# Patient Record
(demographics unavailable — no encounter records)

---

## 2025-02-01 NOTE — HISTORY OF PRESENT ILLNESS
[de-identified] : This is a 5 year old patient here for further evaluation of  abd pain. She often complains of stomach issues. Mom has coliits and IBS. She is gluten-free and off dairy, changed probiotic.  Issues began when she was 2, abdominal pain was intermittent, could go a long time without pain but would happen again. Happened 2 more times and saw GI. Mom tried to add more prune juice but she did not like it.   Stools were every day, now she is stooling most days but not enough.  Stool volume small. Solid. [place] 1-3. Stool is hard to pass and takes a long time. Has rectal pain with stooling. There is no blood or mucous in the stool. With the ex-lax there was some mucous and was on the ex-lax for 2-3 wks, 2 sq daily, when she first took it, it was like diarrhea and then back to solid. Has tried to increased fiber and oatmeal in the diet, tried to increase veg.  Very active.    Rare emesis, last episode of emesis was over a year ago. No nausea. Stomach aches are often, almost daily.

## 2025-02-01 NOTE — ASSESSMENT
[FreeTextEntry1] : 5-year-old female with history of intermittent abdominal discomfort accompanied by hard infrequent stools.  There is a remote history of vomiting which has since resolved.  Most likely has constipation I will treat her for constipation and reassess.  Recommend: -Start magnesium chews start with 1 to 2/day and increase to yield soft daily stools -Increase fiber and fluids in the diet -Labs - Family instructed to call for lab results or if any questions or concerns. Family was asked to make a follow-up visit to be seen in 2 to 3 months

## 2025-02-01 NOTE — HISTORY OF PRESENT ILLNESS
[de-identified] : This is a 5 year old patient here for further evaluation of  abd pain. She often complains of stomach issues. Mom has coliits and IBS. She is gluten-free and off dairy, changed probiotic.  Issues began when she was 2, abdominal pain was intermittent, could go a long time without pain but would happen again. Happened 2 more times and saw GI. Mom tried to add more prune juice but she did not like it.   Stools were every day, now she is stooling most days but not enough.  Stool volume small. Solid. [place] 1-3. Stool is hard to pass and takes a long time. Has rectal pain with stooling. There is no blood or mucous in the stool. With the ex-lax there was some mucous and was on the ex-lax for 2-3 wks, 2 sq daily, when she first took it, it was like diarrhea and then back to solid. Has tried to increased fiber and oatmeal in the diet, tried to increase veg.  Very active.    Rare emesis, last episode of emesis was over a year ago. No nausea. Stomach aches are often, almost daily.

## 2025-02-01 NOTE — CONSULT LETTER
[Dear  ___] : Dear  [unfilled], [Consult Letter:] : I had the pleasure of evaluating your patient, [unfilled]. [Please see my note below.] : Please see my note below. [Consult Closing:] : Thank you very much for allowing me to participate in the care of this patient.  If you have any questions, please do not hesitate to contact me. [Sincerely,] : Sincerely, [FreeTextEntry3] : Michelle Lewis MD Attending Physician [facility]

## 2025-02-01 NOTE — PHYSICAL EXAM
[Well Developed] : well developed [Well Nourished] : well nourished [NAD] : in no acute distress [PERRL] : pupils were equal, round, reactive to light  [icteric] : anicteric [Moist & Pink Mucous Membranes] : moist and pink mucous membranes [Oral Ulcers] : no oral ulcers [CTAB] : lungs clear to auscultation bilaterally [Respiratory Distress] : no respiratory distress  [Wheeze] : no wheezing  [Regular Rate and Rhythm] : regular rate and rhythm [Normal S1, S2] : normal S1 and S2 [Murmur] : no murmur [Soft] : soft  [Distended] : non distended [Tender] : non tender [Normal Bowel Sounds] : normal bowel sounds [Stool Palpable] : no stool palpable [Mass ___ cm] : no masses were palpated [No HSM] : no hepatosplenomegaly appreciated [Lymphadenopathy] : no lymphadenopathy  [Normal Tone] : normal tone [Well-Perfused] : well-perfused [Edema] : no edema [Cyanosis] : no cyanosis [Rash] : no rash [Jaundice] : no jaundice [Interactive] : interactive [Appropriate Behavior] : appropriate behavior